# Patient Record
Sex: FEMALE | Race: WHITE | NOT HISPANIC OR LATINO | URBAN - METROPOLITAN AREA
[De-identification: names, ages, dates, MRNs, and addresses within clinical notes are randomized per-mention and may not be internally consistent; named-entity substitution may affect disease eponyms.]

---

## 2021-05-11 ENCOUNTER — APPOINTMENT (OUTPATIENT)
Dept: WOMENS IMAGING | Facility: HOSPITAL | Age: 46
End: 2021-05-11

## 2021-05-11 ENCOUNTER — PROCEDURE VISIT (OUTPATIENT)
Dept: OBSTETRICS AND GYNECOLOGY | Facility: CLINIC | Age: 46
End: 2021-05-11

## 2021-05-11 ENCOUNTER — OFFICE VISIT (OUTPATIENT)
Dept: OBSTETRICS AND GYNECOLOGY | Facility: CLINIC | Age: 46
End: 2021-05-11

## 2021-05-11 VITALS
HEIGHT: 67 IN | SYSTOLIC BLOOD PRESSURE: 128 MMHG | DIASTOLIC BLOOD PRESSURE: 79 MMHG | BODY MASS INDEX: 41.28 KG/M2 | WEIGHT: 263 LBS

## 2021-05-11 DIAGNOSIS — Z97.5 NEXPLANON IN PLACE: ICD-10-CM

## 2021-05-11 DIAGNOSIS — Z12.31 VISIT FOR SCREENING MAMMOGRAM: Primary | ICD-10-CM

## 2021-05-11 DIAGNOSIS — Z01.419 WELL WOMAN EXAM: Primary | ICD-10-CM

## 2021-05-11 DIAGNOSIS — Z12.31 ENCOUNTER FOR SCREENING MAMMOGRAM FOR MALIGNANT NEOPLASM OF BREAST: ICD-10-CM

## 2021-05-11 DIAGNOSIS — N94.6 DYSMENORRHEA: ICD-10-CM

## 2021-05-11 PROCEDURE — 77067 SCR MAMMO BI INCL CAD: CPT | Performed by: RADIOLOGY

## 2021-05-11 PROCEDURE — 77067 SCR MAMMO BI INCL CAD: CPT | Performed by: STUDENT IN AN ORGANIZED HEALTH CARE EDUCATION/TRAINING PROGRAM

## 2021-05-11 PROCEDURE — 99386 PREV VISIT NEW AGE 40-64: CPT | Performed by: STUDENT IN AN ORGANIZED HEALTH CARE EDUCATION/TRAINING PROGRAM

## 2021-05-11 PROCEDURE — 77063 BREAST TOMOSYNTHESIS BI: CPT | Performed by: STUDENT IN AN ORGANIZED HEALTH CARE EDUCATION/TRAINING PROGRAM

## 2021-05-11 PROCEDURE — 77063 BREAST TOMOSYNTHESIS BI: CPT | Performed by: RADIOLOGY

## 2021-05-11 RX ORDER — METOPROLOL SUCCINATE 200 MG/1
200 TABLET, EXTENDED RELEASE ORAL DAILY
COMMUNITY

## 2021-05-11 RX ORDER — SERTRALINE HYDROCHLORIDE 100 MG/1
TABLET, FILM COATED ORAL
COMMUNITY
Start: 2021-03-19

## 2021-05-11 RX ORDER — LOSARTAN POTASSIUM 50 MG/1
75 TABLET ORAL DAILY
COMMUNITY
Start: 2021-03-26

## 2021-07-26 ENCOUNTER — TELEPHONE (OUTPATIENT)
Dept: OBSTETRICS AND GYNECOLOGY | Facility: CLINIC | Age: 46
End: 2021-07-26

## 2021-08-03 ENCOUNTER — TELEPHONE (OUTPATIENT)
Dept: OBSTETRICS AND GYNECOLOGY | Facility: CLINIC | Age: 46
End: 2021-08-03

## 2021-08-03 NOTE — TELEPHONE ENCOUNTER
Good afternoon,  Patient is returning call Patient stated that she would like to have the Nexplanon device placed and would like a call back about this if possible.    Please advise,  Thank you

## 2021-09-15 ENCOUNTER — PROCEDURE VISIT (OUTPATIENT)
Dept: OBSTETRICS AND GYNECOLOGY | Facility: CLINIC | Age: 46
End: 2021-09-15

## 2021-09-15 VITALS
HEIGHT: 67 IN | BODY MASS INDEX: 41.91 KG/M2 | WEIGHT: 267 LBS | DIASTOLIC BLOOD PRESSURE: 90 MMHG | SYSTOLIC BLOOD PRESSURE: 140 MMHG

## 2021-09-15 DIAGNOSIS — Z30.46 ENCOUNTER FOR REMOVAL AND REINSERTION OF NEXPLANON: Primary | ICD-10-CM

## 2021-09-15 PROCEDURE — 11983 REMOVE/INSERT DRUG IMPLANT: CPT | Performed by: STUDENT IN AN ORGANIZED HEALTH CARE EDUCATION/TRAINING PROGRAM

## 2021-09-29 NOTE — PROGRESS NOTES
Procedure Note     Pre-Operative Diagnosis:   1.Encounter for removal of Nexplanon device from left arm       2.Encoutner for insertion of Nexplanon device in left arm        Post-Operative Diagnosis: Same     Procedure: Nexplanon removal and reinsertion     Anesthetic: 2 mL 1% plain Xylocaine     Estimated Blood Loss: Nil     Complications: no complications were noted     Counseling: Patient was seen and counseled.  Patient desires removal of the implant device because it has  and would like for a new implant device to be reinserted. She was counseled on risks, benefits, alternatives explained. All questions answered. Consents signed. The risks of removal and reinsertion were reviewed including bleeding, infection, damage to surrounding structures, and scarring.    Description of Procedure:   Patient was consented for procedure; questions were answered. The implant was identified in her left upper extremity . The area of the distal implant was cleaned with alcohol and injected with 2 mL of 1% lidocaine.  The skin was prepped with betadine and incised approximately 0.4cm perpendicular to the plane of the implant.  The implant was identified and grasped with a hemostat.  The overlying capsule was dissected off with a hemostat, and the implant was removed in its entirety. Attention was then turned to insertion of new Nexplanon device. The skin at the incision was stretched by my thumb and index finger. Then inserted the needle tip, bevel side up, at an angle not greater than 30° to the skin surface, just until the skin was penetrated to below the bevel. The applicator was then lowered so that it was parallel to the arm. To minimize the chance of deep incision, the skin was tented upwards with the tip of the needle. The needle was then gently inserted through the already existing incision in a different tract then the previously removed device to the full length. Then fixed the device in place on the arm with  one hand. I then slowly and carefully retracted the needle back by retracting the release lever. The obturator was seen in the device to determine that the nexplanon had been released. Both the patient and I were easily able to feel the device under the skin. Steri-Strips were applied at the site, and the arm was gently wrapped with gauze.    There were no complications.   The patient tolerated the procedure well.     She was given a reminder card. She was advised to use condoms as a backup method for at least a week after insertion.    Expectations, warning signs and limitations reviewed.     Hue Dorman MD